# Patient Record
Sex: FEMALE | Race: WHITE | HISPANIC OR LATINO | Employment: STUDENT | URBAN - METROPOLITAN AREA
[De-identification: names, ages, dates, MRNs, and addresses within clinical notes are randomized per-mention and may not be internally consistent; named-entity substitution may affect disease eponyms.]

---
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
## 2025-01-27 ENCOUNTER — OFFICE VISIT (OUTPATIENT)
Dept: URGENT CARE | Facility: CLINIC | Age: 20
End: 2025-01-27
Payer: COMMERCIAL

## 2025-01-27 VITALS
BODY MASS INDEX: 22.58 KG/M2 | WEIGHT: 115 LBS | OXYGEN SATURATION: 98 % | TEMPERATURE: 99 F | SYSTOLIC BLOOD PRESSURE: 107 MMHG | HEIGHT: 60 IN | HEART RATE: 66 BPM | DIASTOLIC BLOOD PRESSURE: 67 MMHG | RESPIRATION RATE: 19 BRPM

## 2025-01-27 DIAGNOSIS — N89.8 VAGINAL ITCHING: Primary | ICD-10-CM
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
## 2025-01-27 PROCEDURE — 81515 NFCT DS BV&VAGINITIS DNA ALG: CPT | Performed by: NURSE PRACTITIONER

## 2025-01-27 PROCEDURE — 99213 OFFICE O/P EST LOW 20 MIN: CPT | Mod: S$GLB,,, | Performed by: NURSE PRACTITIONER
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
## 2025-01-27 NOTE — PATIENT INSTRUCTIONS
Tylenol 2- 500 mg tablets  Ibuprofen 3- 200 mg tablets (with food)   Rotate these every 6 hours as needed for pain.

## 2025-01-31 ENCOUNTER — PATIENT MESSAGE (OUTPATIENT)
Dept: URGENT CARE | Facility: CLINIC | Age: 20
End: 2025-01-31
Payer: COMMERCIAL

## 2025-01-31 LAB
BACTERIAL VAGINOSIS DNA: NOT DETECTED
CANDIDA GLABRATA/KRUSEI: NOT DETECTED
CANDIDA RRNA VAG QL PROBE: NOT DETECTED
TRICHOMONAS VAGINALIS: NOT DETECTED

## 2025-03-11 ENCOUNTER — OFFICE VISIT (OUTPATIENT)
Dept: URGENT CARE | Facility: CLINIC | Age: 20
End: 2025-03-11
Payer: COMMERCIAL

## 2025-03-11 VITALS
WEIGHT: 115 LBS | BODY MASS INDEX: 22.58 KG/M2 | OXYGEN SATURATION: 97 % | HEART RATE: 78 BPM | SYSTOLIC BLOOD PRESSURE: 126 MMHG | HEIGHT: 60 IN | TEMPERATURE: 100 F | RESPIRATION RATE: 18 BRPM | DIASTOLIC BLOOD PRESSURE: 84 MMHG

## 2025-03-11 DIAGNOSIS — H10.33 ACUTE BACTERIAL CONJUNCTIVITIS OF BOTH EYES: Primary | ICD-10-CM

## 2025-03-11 PROCEDURE — 99213 OFFICE O/P EST LOW 20 MIN: CPT | Mod: S$GLB,,, | Performed by: NURSE PRACTITIONER

## 2025-03-11 RX ORDER — OFLOXACIN 3 MG/ML
1 SOLUTION/ DROPS OPHTHALMIC 4 TIMES DAILY
Qty: 10 ML | Refills: 0 | Status: SHIPPED | OUTPATIENT
Start: 2025-03-11 | End: 2025-03-18

## 2025-03-11 NOTE — PROGRESS NOTES
Subjective:      Patient ID: Ayleen Tatum is a 20 y.o. female.    Vitals:  height is 5' (1.524 m) and weight is 52.2 kg (115 lb). Her oral temperature is 99.5 °F (37.5 °C). Her blood pressure is 126/84 and her pulse is 78. Her respiration is 18 and oxygen saturation is 97%.     Chief Complaint: Eye Problem    This is a 20 y.o. female who presents today with a chief complaint of discharge in both eyes this morning and irritation.    Eye Problem   Both eyes are affected. This is a new problem. The current episode started today. The pain is at a severity of 0/10. The patient is experiencing no pain. Associated symptoms include an eye discharge and eye redness. She has tried nothing for the symptoms.       Eyes:  Positive for eye discharge and eye redness.        Dx with Strep throat during [place] break.  Just finished Amoxicillin (this morning) and took prednisone for 5 days. 3-4 days ago started with increased mucus, coughing and today she had drainage coming from her eyes (yellow,green) and they were stuck together.   Objective:     Physical Exam   Constitutional: She is oriented to person, place, and time.  Non-toxic appearance. She does not appear ill. No distress.   HENT:   Head: Normocephalic and atraumatic.   Ears:   Right Ear: Tympanic membrane normal.   Left Ear: Tympanic membrane normal.   Nose: No rhinorrhea or congestion.   Mouth/Throat: Mucous membranes are moist. Oropharynx is clear.   Eyes: Pupils are equal, round, and reactive to light. Right eye exhibits discharge. Left eye exhibits discharge. Right conjunctiva is injected. Left conjunctiva is injected. Extraocular movement intact   Neck: Neck supple.   Cardiovascular: Normal rate, regular rhythm, normal heart sounds and normal pulses.   Pulmonary/Chest: Effort normal and breath sounds normal. No respiratory distress. She has no wheezes.   Abdominal: Normal appearance.   Musculoskeletal: Normal range of motion.         General: Normal range  of motion.   Neurological: no focal deficit. She is alert and oriented to person, place, and time.   Skin: Skin is warm and not diaphoretic.   Psychiatric: Her behavior is normal. Mood normal.   Nursing note and vitals reviewed.    Assessment:     1. Acute bacterial conjunctivitis of both eyes        Plan:       Acute bacterial conjunctivitis of both eyes  -     ofloxacin (OCUFLOX) 0.3 % ophthalmic solution; Place 1 drop into both eyes 4 (four) times daily. for 7 days  Dispense: 10 mL; Refill: 0

## 2025-03-11 NOTE — LETTER
March 11, 2025    Ayleen Tatum  [address]  [address]  [facility] [address]             Urgent [facility] - [facility]  Urgent Care  [address] [address]  Phone: [phone]  Fax: [phone]   March 11, 2025     Patient: Ayleen Tatum   YOB: 2005   Date of Visit: 3/11/2025       To Whom it May Concern:    Ayleen Tatum was seen in my clinic on 3/11/2025. She may return to school on 03/12/25 .    Please excuse her from any classes or work missed.    If you have any questions or concerns, please don't hesitate to call.    Sincerely,         Samina Remy, NP

## 2025-04-02 ENCOUNTER — OFFICE VISIT (OUTPATIENT)
Dept: URGENT CARE | Facility: CLINIC | Age: 20
End: 2025-04-02
Payer: COMMERCIAL

## 2025-04-02 VITALS
TEMPERATURE: 98 F | BODY MASS INDEX: 23.1 KG/M2 | HEIGHT: 60 IN | WEIGHT: 117.63 LBS | HEART RATE: 69 BPM | DIASTOLIC BLOOD PRESSURE: 75 MMHG | RESPIRATION RATE: 18 BRPM | SYSTOLIC BLOOD PRESSURE: 114 MMHG | OXYGEN SATURATION: 99 %

## 2025-04-02 DIAGNOSIS — Z11.3 SCREENING EXAMINATION FOR VENEREAL DISEASE: ICD-10-CM

## 2025-04-02 DIAGNOSIS — R10.2 VAGINAL PAIN: ICD-10-CM

## 2025-04-02 DIAGNOSIS — N89.8 VAGINAL ITCHING: Primary | ICD-10-CM

## 2025-04-02 PROCEDURE — 99213 OFFICE O/P EST LOW 20 MIN: CPT | Mod: S$GLB,,, | Performed by: NURSE PRACTITIONER

## 2025-04-02 PROCEDURE — 87491 CHLMYD TRACH DNA AMP PROBE: CPT | Performed by: NURSE PRACTITIONER

## 2025-04-02 PROCEDURE — 81515 NFCT DS BV&VAGINITIS DNA ALG: CPT | Performed by: NURSE PRACTITIONER

## 2025-04-02 NOTE — PROGRESS NOTES
Subjective:      Patient ID: Ayleen Tatum is a 20 y.o. female.    Vitals:  height is 5' (1.524 m) and weight is 53.4 kg (117 lb 9.9 oz). Her oral temperature is 98.2 °F (36.8 °C). Her blood pressure is 114/75 and her pulse is 69. Her respiration is 18 and oxygen saturation is 99%.     Chief Complaint: Exposure to STD    Pt states that when she has intercourse her clitoris is bloody and tender to touch    Exposure to STD   The patient's primary symptoms include a discharge and genital itching. The patient's pertinent negatives include no dyspareunia, dysuria, genital lesions, genital rash, genital warts or pelvic pain. This is a new problem. The current episode started in the past 7 days (Saturday). The problem has been gradually worsening. She reports condom usage. The vaginal discharge was milky, thick and white. Pertinent negatives include no abdominal pain, anorexia, chills, constipation, diaphoresis, diarrhea, discolored urine, fatigue, fever, flank pain, genital odor, headaches, hesitancy, joint swelling, myalgias, nausea, numbness, painful intercourse, rash, rectal pain, sore throat, swollen glands, urinary frequency, urinary retention, vertigo, vomiting or weakness. The symptoms are aggravated by: nothing.She has tried ice pack for the symptoms. The treatment provided moderate relief.       Constitution: Negative for chills, sweating, fatigue and fever.   HENT:  Negative for sore throat.    Gastrointestinal:  Negative for abdominal pain, nausea, vomiting, constipation, diarrhea and rectal pain.   Genitourinary:  Negative for dysuria, frequency, flank pain and pelvic pain.   Musculoskeletal:  Negative for joint swelling and muscle ache.   Neurological:  Negative for history of vertigo, headaches and numbness.      Objective:     Physical Exam   Constitutional: She is oriented to person, place, and time.  Non-toxic appearance. She does not appear ill. No distress.   HENT:   Head: Normocephalic and  atraumatic.   Nose: No rhinorrhea or congestion.   Mouth/Throat: Mucous membranes are moist. Oropharynx is clear.   Eyes: Conjunctivae are normal. Pupils are equal, round, and reactive to light. Extraocular movement intact   Neck: Neck supple.   Cardiovascular: Normal rate, regular rhythm, normal heart sounds and normal pulses.   Pulmonary/Chest: Effort normal and breath sounds normal. No respiratory distress. She has no wheezes.   Abdominal: Normal appearance.   Genitourinary:    Vulva normal.   No vaginal rash.    Pelvic exam was performed with patient in the lithotomy position.   There is no rash, tenderness, lesion or injury on the right labia. There is no rash, tenderness, lesion or injury on the left labia.    No labial rash noted.   Urethra has/is not prolapse, no urethral pain, no urethral swelling and no urethral lesion.         Comments: No bleeding noted at this time.  She states the bleeding stopped several days ago.  No discharge noted from the vaginal vault but pt states she has had d/c and it has been itchy requesting swab for yeast/BV/and STI     Musculoskeletal:      Right lower leg: No edema.      Left lower leg: No edema.   Neurological: no focal deficit. She is alert and oriented to person, place, and time. She displays no weakness.   Skin: Skin is warm, dry and not diaphoretic.   Psychiatric: Her behavior is normal. Mood normal.   Nursing note and vitals reviewed.      Assessment:     1. Vaginal itching    2. Vaginal pain    3. Screening examination for venereal disease        Plan:       Vaginal itching  -     C. trachomatis/N. gonorrhoeae by AMP DNA [facility]; Vagina  -     Vaginosis Screen by DNA Probe    Vaginal pain  -     C. trachomatis/N. gonorrhoeae by AMP DNA [facility]; Vagina  -     Vaginosis Screen by DNA Probe    Screening examination for venereal disease  -     C. trachomatis/N. gonorrhoeae by AMP DNA [facility]; Vagina

## 2025-04-05 LAB
C TRACH DNA SPEC QL NAA+PROBE: NOT DETECTED
CTGC SOURCE (OHS) ORD-325: NORMAL
N GONORRHOEA DNA UR QL NAA+PROBE: NOT DETECTED

## 2025-04-08 ENCOUNTER — PATIENT MESSAGE (OUTPATIENT)
Dept: URGENT CARE | Facility: CLINIC | Age: 20
End: 2025-04-08
Payer: COMMERCIAL